# Patient Record
Sex: FEMALE | Race: WHITE | NOT HISPANIC OR LATINO | Employment: UNEMPLOYED | URBAN - METROPOLITAN AREA
[De-identification: names, ages, dates, MRNs, and addresses within clinical notes are randomized per-mention and may not be internally consistent; named-entity substitution may affect disease eponyms.]

---

## 2017-06-19 ENCOUNTER — APPOINTMENT (OUTPATIENT)
Dept: WOMENS IMAGING | Facility: HOSPITAL | Age: 57
End: 2017-06-19

## 2017-06-19 PROCEDURE — 77063 BREAST TOMOSYNTHESIS BI: CPT | Performed by: RADIOLOGY

## 2017-06-19 PROCEDURE — MDREVIEWSP: Performed by: RADIOLOGY

## 2017-06-19 PROCEDURE — G0202 SCR MAMMO BI INCL CAD: HCPCS | Performed by: RADIOLOGY

## 2017-06-19 PROCEDURE — 77067 SCR MAMMO BI INCL CAD: CPT | Performed by: RADIOLOGY

## 2017-07-24 ENCOUNTER — TELEPHONE (OUTPATIENT)
Dept: OBSTETRICS AND GYNECOLOGY | Age: 57
End: 2017-07-24

## 2017-07-24 NOTE — TELEPHONE ENCOUNTER
Dr HUERTAS pt on Estradiol 0.035mg and progesterone 200mg. Was on vacation in [place], her breast started hurting like she was going to start her period, she did change her patch a day late and the progesterone was exposed to heat. This past Saturday she started bleeding, not heavy, last night pt stated she started on a new patch. Please advise

## 2017-07-31 ENCOUNTER — OFFICE VISIT (OUTPATIENT)
Dept: OBSTETRICS AND GYNECOLOGY | Age: 57
End: 2017-07-31

## 2017-07-31 ENCOUNTER — PROCEDURE VISIT (OUTPATIENT)
Dept: OBSTETRICS AND GYNECOLOGY | Age: 57
End: 2017-07-31

## 2017-07-31 VITALS
DIASTOLIC BLOOD PRESSURE: 80 MMHG | BODY MASS INDEX: 20.57 KG/M2 | SYSTOLIC BLOOD PRESSURE: 142 MMHG | HEIGHT: 62 IN | WEIGHT: 111.8 LBS

## 2017-07-31 DIAGNOSIS — N95.0 POSTMENOPAUSAL BLEEDING: ICD-10-CM

## 2017-07-31 DIAGNOSIS — E78.00 HYPERCHOLESTEREMIA: Primary | ICD-10-CM

## 2017-07-31 DIAGNOSIS — N83.202 CYST OF LEFT OVARY: ICD-10-CM

## 2017-07-31 DIAGNOSIS — N95.0 PMB (POSTMENOPAUSAL BLEEDING): Primary | ICD-10-CM

## 2017-07-31 PROCEDURE — 76830 TRANSVAGINAL US NON-OB: CPT | Performed by: OBSTETRICS & GYNECOLOGY

## 2017-07-31 PROCEDURE — 99213 OFFICE O/P EST LOW 20 MIN: CPT | Performed by: OBSTETRICS & GYNECOLOGY

## 2017-07-31 RX ORDER — VALACYCLOVIR HYDROCHLORIDE 1 G/1
TABLET, FILM COATED ORAL
Refills: 2 | COMMUNITY
Start: 2017-07-27

## 2017-07-31 RX ORDER — EZETIMIBE 10 MG/1
TABLET ORAL
Refills: 3 | COMMUNITY
Start: 2017-07-27

## 2017-07-31 RX ORDER — VALACYCLOVIR HYDROCHLORIDE 500 MG/1
TABLET, FILM COATED ORAL
Refills: 2 | COMMUNITY
Start: 2017-07-28 | End: 2018-01-25

## 2017-07-31 NOTE — PROGRESS NOTES
Subjective   Yasmin Armenta is a 57 y.o. female is being seen today for PMB ultrasound.  Chief Complaint   Patient presents with   • Gynecologic Exam   .    History of Present Illness  Patient presents for an ultrasound for postmenopausal bleeding.  When she first started on HRT she was still somewhat perimenopausal so we do not know if she is 100% over bleeding part.  She is on HRT and doing well at the level she is on.  She had some bleeding last October and I increased her progesterone to 200 mg at that time the lining was 6.5 and she is done very well since until just recently had some more bleeding.  She presents for an ultrasound  The following portions of the patient's history were reviewed and updated as appropriate: allergies, current medications, past family history, past medical history, past social history, past surgical history and problem list.    Vitals:    07/31/17 1439   BP: 142/80         PAST MEDICAL HISTORY  Past Medical History:   Diagnosis Date   • Chest pain    • Health care maintenance    • Hyperlipidemia    • Mitral regurgitation    • Mitral valve prolapse      OB History     No data available        History reviewed. No pertinent surgical history.  Family History   Problem Relation Age of Onset   • Heart attack Mother    • Heart disease Mother    • Hyperlipidemia Mother    • Heart attack Father    • Heart disease Father    • Hypertension Father    • No Known Problems Sister    • No Known Problems Brother    • Stroke Maternal Grandmother    • No Known Problems Maternal Grandfather    • Stroke Paternal Grandmother    • Heart disease Paternal Grandfather    • Heart attack Paternal Grandfather      History   Smoking Status   • Never Smoker   Smokeless Tobacco   • Not on file     Comment: caffine use       Current Outpatient Prescriptions:   •  amphetamine-dextroamphetamine (ADDERALL) 20 MG tablet, Take 20 mg by mouth Daily. 1.5 tablets 30mg , Disp: , Rfl:   •  estradiol (VIVELLE-DOT) 0.0375  MG/24HR, APPLY 1 PATCH TWICE WEEKLY AS DIRECTED., Disp: 8 patch, Rfl: 9  •  ezetimibe (ZETIA) 10 MG tablet, TAKE 1 TABLET EVERY DAY, Disp: , Rfl: 3  •  montelukast (SINGULAIR) 10 MG tablet, Take 10 mg by mouth Every Night., Disp: , Rfl:   •  progesterone (PROMETRIUM) 200 MG capsule, TAKE 1 CAPSULE EVERY DAY, Disp: 90 capsule, Rfl: 2  •  spironolactone (ALDACTONE) 100 MG tablet, Take 100 mg by mouth As Needed., Disp: , Rfl:   •  SUMAtriptan (IMITREX) 50 MG tablet, Take one tablet at onset of headache. May repeat dose one time in 2 hours if headache not relieved., Disp: , Rfl: 3  •  valACYclovir (VALTREX) 1000 MG tablet, 2 TABLETS BY MOUTH AS DIRECTED NOW AT ONSET OF FEVER BLISTER SYMPTOMS, REPEAT IN 12 HOURS, Disp: , Rfl: 2  •  valACYclovir (VALTREX) 500 MG tablet, TAKE 1 TABLET EVERY DAY, Disp: , Rfl: 2    There is no immunization history on file for this patient.    Review of Systems  GI  negative no other complaints.  She is under stress she has a marriage and her family coming up in a week.  She also mentioned about changing altitude from 10,000 down to see level and that's when the bleeding started.  And she is off a little bit in the way she takes her patch and will try to get more regular.(  Objective   Physical Exam  Well-developed well-nourished white female no acute distress    Assessment/Plan   Yasmin was seen today for gynecologic exam.    Diagnoses and all orders for this visit:    Hypercholesteremia    Postmenopausal bleeding    Cyst of left ovary    Transvaginal ultrasound was performed and I was in the room the whole time and discussed all the results with the patient and followed up with a discussion my office.  Over 30 minutes were spent with the patient more than half of which was spent in counseling.  Uterus did show 3 small fibroids this is more than what she had last time and they are patent tensely slightly larger and I talked about that in terms of estrogen etc.  After the wedding we will try  to cut back to 0.025 on the patch.  The lining was 4.3 which is an improvement from the 6.5 in October.  We could not see the right ovary left ovary had a cyst 24.7 mm.  I will follow up in 2 months to repeat the scan for that ovary.

## 2017-10-10 RX ORDER — ESTRADIOL 0.04 MG/D
FILM, EXTENDED RELEASE TRANSDERMAL
Qty: 8 PATCH | Refills: 3 | Status: SHIPPED | OUTPATIENT
Start: 2017-10-10 | End: 2018-02-02 | Stop reason: SDUPTHER

## 2017-10-16 ENCOUNTER — PROCEDURE VISIT (OUTPATIENT)
Dept: OBSTETRICS AND GYNECOLOGY | Age: 57
End: 2017-10-16

## 2017-10-16 ENCOUNTER — OFFICE VISIT (OUTPATIENT)
Dept: OBSTETRICS AND GYNECOLOGY | Age: 57
End: 2017-10-16

## 2017-10-16 VITALS — DIASTOLIC BLOOD PRESSURE: 80 MMHG | SYSTOLIC BLOOD PRESSURE: 132 MMHG

## 2017-10-16 DIAGNOSIS — N95.0 PMB (POSTMENOPAUSAL BLEEDING): Primary | ICD-10-CM

## 2017-10-16 DIAGNOSIS — N95.0 POSTMENOPAUSAL BLEEDING: ICD-10-CM

## 2017-10-16 DIAGNOSIS — N83.202 CYST OF LEFT OVARY: Primary | ICD-10-CM

## 2017-10-16 DIAGNOSIS — D25.9 UTERINE LEIOMYOMA, UNSPECIFIED LOCATION: ICD-10-CM

## 2017-10-16 DIAGNOSIS — N83.202 CYST OF LEFT OVARY: ICD-10-CM

## 2017-10-16 PROCEDURE — 99212 OFFICE O/P EST SF 10 MIN: CPT | Performed by: OBSTETRICS & GYNECOLOGY

## 2017-10-16 PROCEDURE — 76830 TRANSVAGINAL US NON-OB: CPT | Performed by: OBSTETRICS & GYNECOLOGY

## 2017-10-16 NOTE — PROGRESS NOTES
Subjective   Yasmin Armenta is a 57 y.o. female is being seen today for a follow up ultrasound.   Chief Complaint   Patient presents with   • Follow-up     Ultrasound   .    History of Present Illness  Patient is here for a follow-up for her ovarian cyst.  She is back to full strength on the patch because when she cut down she had a few side effects.  She's had no more further bleeding.  No other complaints  The following portions of the patient's history were reviewed and updated as appropriate: allergies, current medications, past family history, past medical history, past social history, past surgical history and problem list.    Vitals:    10/16/17 1114   BP: 132/80         PAST MEDICAL HISTORY  Past Medical History:   Diagnosis Date   • Chest pain    • Health care maintenance    • Hyperlipidemia    • Mitral regurgitation    • Mitral valve prolapse      OB History     No data available        History reviewed. No pertinent surgical history.  Family History   Problem Relation Age of Onset   • Heart attack Mother    • Heart disease Mother    • Hyperlipidemia Mother    • Heart attack Father    • Heart disease Father    • Hypertension Father    • No Known Problems Sister    • No Known Problems Brother    • Stroke Maternal Grandmother    • No Known Problems Maternal Grandfather    • Stroke Paternal Grandmother    • Heart disease Paternal Grandfather    • Heart attack Paternal Grandfather      History   Smoking Status   • Never Smoker   Smokeless Tobacco   • Never Used     Comment: caffine use       Current Outpatient Prescriptions:   •  amphetamine-dextroamphetamine (ADDERALL) 20 MG tablet, Take 20 mg by mouth Daily. 1.5 tablets 30mg , Disp: , Rfl:   •  estradiol (VIVELLE-DOT) 0.0375 MG/24HR, APPLY 1 PATCH TWICE WEEKLY AS DIRECTED., Disp: 8 patch, Rfl: 3  •  ezetimibe (ZETIA) 10 MG tablet, TAKE 1 TABLET EVERY DAY, Disp: , Rfl: 3  •  montelukast (SINGULAIR) 10 MG tablet, Take 10 mg by mouth Every Night., Disp: , Rfl:    •  progesterone (PROMETRIUM) 200 MG capsule, TAKE 1 CAPSULE EVERY DAY, Disp: 90 capsule, Rfl: 2  •  spironolactone (ALDACTONE) 100 MG tablet, Take 100 mg by mouth As Needed., Disp: , Rfl:   •  SUMAtriptan (IMITREX) 50 MG tablet, Take one tablet at onset of headache. May repeat dose one time in 2 hours if headache not relieved., Disp: , Rfl: 3  •  valACYclovir (VALTREX) 1000 MG tablet, 2 TABLETS BY MOUTH AS DIRECTED NOW AT ONSET OF FEVER BLISTER SYMPTOMS, REPEAT IN 12 HOURS, Disp: , Rfl: 2  •  valACYclovir (VALTREX) 500 MG tablet, TAKE 1 TABLET EVERY DAY, Disp: , Rfl: 2    There is no immunization history on file for this patient.    Review of Systems  Negative  Objective   Physical Exam  Well-developed well-nourished white female no acute distress    Assessment/Plan   Yasmin was seen today for follow-up.    Diagnoses and all orders for this visit:    Cyst of left ovary    Transvaginal ultrasound was performed and I was in the room the whole time and discussed the results with the patient.  Perineum is normal.  The uterus still has his fibroids of courses lining is now under 4 mm.  No cysts were seen and affect ovaries were seen.  Come back for an annual in a few months

## 2018-01-25 ENCOUNTER — OFFICE VISIT (OUTPATIENT)
Dept: OBSTETRICS AND GYNECOLOGY | Age: 58
End: 2018-01-25

## 2018-01-25 VITALS
HEIGHT: 62 IN | WEIGHT: 112 LBS | BODY MASS INDEX: 20.61 KG/M2 | SYSTOLIC BLOOD PRESSURE: 114 MMHG | DIASTOLIC BLOOD PRESSURE: 58 MMHG

## 2018-01-25 DIAGNOSIS — M85.89 OSTEOPENIA OF MULTIPLE SITES: ICD-10-CM

## 2018-01-25 DIAGNOSIS — N95.1 MENOPAUSAL SYMPTOMS: Primary | ICD-10-CM

## 2018-01-25 DIAGNOSIS — Z00.00 ANNUAL PHYSICAL EXAM: ICD-10-CM

## 2018-01-25 DIAGNOSIS — Z11.51 SPECIAL SCREENING EXAMINATION FOR HUMAN PAPILLOMAVIRUS (HPV): ICD-10-CM

## 2018-01-25 DIAGNOSIS — Z12.4 ROUTINE CERVICAL SMEAR: ICD-10-CM

## 2018-01-25 PROCEDURE — 99396 PREV VISIT EST AGE 40-64: CPT | Performed by: OBSTETRICS & GYNECOLOGY

## 2018-01-25 NOTE — PROGRESS NOTES
Subjective   Yasmin Armenta is a 57 y.o. female is being seen today for an annual exam.  Chief Complaint   Patient presents with   • Gynecologic Exam   .    History of Present Illness  Patient presents for an annual exam.  Overall she doing very well no major complaints today or in the past year.  No new family history bowels and bladder are stable and doing okay.  No further bleeding since the last visit.  There is traveling some father is about to turn 85.  Blood pressure days better than it has been for a while.  Neck occasional she is on vacation in the early winter.  Out of 10,000 that has made her spot in the past.  The following portions of the patient's history were reviewed and updated as appropriate: allergies, current medications, past family history, past medical history, past social history, past surgical history and problem list.    Vitals:    01/25/18 1400   BP: 114/58       PAST MEDICAL HISTORY  Past Medical History:   Diagnosis Date   • Chest pain    • Health care maintenance    • Hyperlipidemia    • Mitral regurgitation    • Mitral valve prolapse      OB History     No data available        History reviewed. No pertinent surgical history.  Family History   Problem Relation Age of Onset   • Heart attack Mother    • Heart disease Mother    • Hyperlipidemia Mother    • Heart attack Father    • Heart disease Father    • Hypertension Father    • No Known Problems Sister    • No Known Problems Brother    • Stroke Maternal Grandmother    • No Known Problems Maternal Grandfather    • Stroke Paternal Grandmother    • Heart disease Paternal Grandfather    • Heart attack Paternal Grandfather      History   Smoking Status   • Never Smoker   Smokeless Tobacco   • Never Used     Comment: caffine use       Current Outpatient Prescriptions:   •  amphetamine-dextroamphetamine (ADDERALL) 20 MG tablet, Take 20 mg by mouth Daily. 1.5 tablets 30mg , Disp: , Rfl:   •  estradiol (VIVELLE-DOT) 0.0375 MG/24HR, APPLY 1 PATCH  TWICE WEEKLY AS DIRECTED., Disp: 8 patch, Rfl: 3  •  ezetimibe (ZETIA) 10 MG tablet, TAKE 1 TABLET EVERY DAY, Disp: , Rfl: 3  •  progesterone (PROMETRIUM) 200 MG capsule, TAKE 1 CAPSULE EVERY DAY, Disp: 90 capsule, Rfl: 2  •  spironolactone (ALDACTONE) 100 MG tablet, Take 100 mg by mouth As Needed., Disp: , Rfl:   •  SUMAtriptan (IMITREX) 50 MG tablet, Take one tablet at onset of headache. May repeat dose one time in 2 hours if headache not relieved., Disp: , Rfl: 3  •  valACYclovir (VALTREX) 1000 MG tablet, 2 TABLETS BY MOUTH AS DIRECTED NOW AT ONSET OF FEVER BLISTER SYMPTOMS, REPEAT IN 12 HOURS, Disp: , Rfl: 2    There is no immunization history on file for this patient.    Review of Systems   Constitutional: Negative for chills, fatigue, fever and unexpected weight change.   Respiratory: Negative for shortness of breath and wheezing.    Cardiovascular: Negative for chest pain.   Gastrointestinal: Negative for abdominal distention, abdominal pain, blood in stool, constipation, diarrhea and nausea.   Genitourinary: Negative for difficulty urinating, dyspareunia, dysuria, frequency, hematuria, menstrual problem, pelvic pain, urgency and vaginal discharge.   Skin: Negative for rash.       Objective   Physical Exam   Constitutional: She is oriented to person, place, and time. Vital signs are normal. She appears well-developed and well-nourished.   Neck: No thyromegaly present.   Cardiovascular: Normal rate, regular rhythm and normal heart sounds.    Pulmonary/Chest: Effort normal. Right breast exhibits no inverted nipple, no mass, no nipple discharge, no skin change and no tenderness. Left breast exhibits no inverted nipple, no mass, no nipple discharge, no skin change and no tenderness. Breasts are symmetrical. There is no breast swelling.   Abdominal: Soft.   Genitourinary: Vagina normal and uterus normal. No breast tenderness, discharge or bleeding. Pelvic exam was performed with patient supine. No labial fusion.  There is no rash, tenderness, lesion or injury on the right labia. There is no rash, tenderness, lesion or injury on the left labia. Cervix exhibits no motion tenderness, no discharge and no friability. Right adnexum displays no mass, no tenderness and no fullness. Left adnexum displays no mass, no tenderness and no fullness.   Neurological: She is alert and oriented to person, place, and time.   Psychiatric: She has a normal mood and affect.   Vitals reviewed.        Assessment/Plan   Yasmin was seen today for gynecologic exam.    Diagnoses and all orders for this visit:    Menopausal symptoms    Osteopenia of multiple sites    Annual physical exam    All in all very normal exam today.  Should there is some encapsulation of her breasts but she is not about to change that at this time.  Pap smear was done today.  Mammogram will be due in June.  Colonoscopy was in 16 she probably and a 5 year schedule for family history the bone densities up-to-date last year.  Diet and excise were discussed she is good at that come back in year

## 2018-01-29 LAB
CYTOLOGIST CVX/VAG CYTO: NORMAL
CYTOLOGY CVX/VAG DOC THIN PREP: NORMAL
DX ICD CODE: NORMAL
HIV 1 & 2 AB SER-IMP: NORMAL
HPV I/H RISK 4 DNA CVX QL PROBE+SIG AMP: NEGATIVE
OTHER STN SPEC: NORMAL
PATH REPORT.FINAL DX SPEC: NORMAL
STAT OF ADQ CVX/VAG CYTO-IMP: NORMAL

## 2018-02-02 RX ORDER — ESTRADIOL 0.04 MG/D
FILM, EXTENDED RELEASE TRANSDERMAL
Qty: 8 PATCH | Refills: 3 | Status: SHIPPED | OUTPATIENT
Start: 2018-02-02 | End: 2018-04-09 | Stop reason: SDUPTHER

## 2018-03-02 NOTE — TELEPHONE ENCOUNTER
Dr HUERTAS pt left her progesterone in [place], leaving town today at noon, needs another Rx sent into her pharm

## 2018-04-09 RX ORDER — ESTRADIOL 0.04 MG/D
1 FILM, EXTENDED RELEASE TRANSDERMAL 2 TIMES WEEKLY
Qty: 8 PATCH | Refills: 3 | Status: SHIPPED | OUTPATIENT
Start: 2018-04-09 | End: 2018-07-11 | Stop reason: SDUPTHER

## 2018-07-11 RX ORDER — ESTRADIOL 0.04 MG/D
1 FILM, EXTENDED RELEASE TRANSDERMAL 2 TIMES WEEKLY
Qty: 8 PATCH | Refills: 5 | Status: SHIPPED | OUTPATIENT
Start: 2018-07-12 | End: 2019-02-15 | Stop reason: SDUPTHER

## 2019-02-15 ENCOUNTER — OFFICE VISIT (OUTPATIENT)
Dept: OBSTETRICS AND GYNECOLOGY | Age: 59
End: 2019-02-15

## 2019-02-15 DIAGNOSIS — Z00.00 ANNUAL PHYSICAL EXAM: Primary | ICD-10-CM

## 2019-02-15 DIAGNOSIS — Z12.39 BREAST SCREENING: ICD-10-CM

## 2019-02-15 PROBLEM — G43.909 MIGRAINE: Status: ACTIVE | Noted: 2018-01-22

## 2019-02-15 PROBLEM — I67.1 ANEURYSM OF LEFT INTERNAL CAROTID ARTERY: Status: ACTIVE | Noted: 2019-01-08

## 2019-02-15 PROBLEM — F90.0 ATTENTION DEFICIT HYPERACTIVITY DISORDER, PREDOMINANTLY INATTENTIVE TYPE: Status: ACTIVE | Noted: 2018-01-22

## 2019-02-15 PROBLEM — I67.1 NONRUPTURED CEREBRAL ANEURYSM: Status: ACTIVE | Noted: 2018-12-17

## 2019-02-15 PROBLEM — G50.0 TRIGEMINAL NEURALGIA OF RIGHT SIDE OF FACE: Status: ACTIVE | Noted: 2018-12-17

## 2019-02-15 PROBLEM — I67.1 ANEURYSM OF LEFT INTERNAL CAROTID ARTERY: Status: RESOLVED | Noted: 2019-01-08 | Resolved: 2019-02-15

## 2019-02-15 PROCEDURE — 99396 PREV VISIT EST AGE 40-64: CPT | Performed by: OBSTETRICS & GYNECOLOGY

## 2019-02-15 RX ORDER — ESTRADIOL 0.04 MG/D
1 FILM, EXTENDED RELEASE TRANSDERMAL 2 TIMES WEEKLY
Qty: 24 PATCH | Refills: 3 | Status: SHIPPED | OUTPATIENT
Start: 2019-02-18 | End: 2019-02-15 | Stop reason: SDUPTHER

## 2019-02-15 RX ORDER — ESTRADIOL 0.04 MG/D
1 FILM, EXTENDED RELEASE TRANSDERMAL 2 TIMES WEEKLY
Qty: 24 PATCH | Refills: 3 | Status: SHIPPED | OUTPATIENT
Start: 2019-02-18 | End: 2020-01-13

## 2019-02-15 RX ORDER — GABAPENTIN 100 MG/1
100 CAPSULE ORAL 2 TIMES DAILY
Refills: 1 | COMMUNITY
Start: 2018-12-18 | End: 2019-04-30

## 2019-02-15 NOTE — PROGRESS NOTES
Subjective   Yasmin Armenta is a 58 y.o. female is being seen today for No chief complaint on file.  .    History of Present Illness  Patient is here for an annual check.  She did have a major problem with come up this year.  She had her second shingles vaccine and immediately had a.  She had trigeminal neuralgia went on for 6-8 weeks.  Initially they did not know what was and initially with a MRI of the brain thought she might have an aneurysm.  By the second 1 did not show that she will should probably get one more in about 3 months to double check on that.  She is better now ever being treated with gabapentin and steroids.  Other than that she is doing well expecting first grandchild about a month and a half.  She is doing well in the HRT does not want to change anything if she is a day late she starts having hot flashes.  No new family history bowels and bladder work well no other complaints  The following portions of the patient's history were reviewed and updated as appropriate: allergies, current medications, past family history, past medical history, past social history, past surgical history and problem list.    There were no vitals filed for this visit.    PAST MEDICAL HISTORY  Past Medical History:   Diagnosis Date   • Chest pain    • Health care maintenance    • Hyperlipidemia    • Mitral regurgitation    • Mitral valve prolapse      OB History     No data available        History reviewed. No pertinent surgical history.  Family History   Problem Relation Age of Onset   • Heart attack Mother    • Heart disease Mother    • Hyperlipidemia Mother    • Heart attack Father    • Heart disease Father    • Hypertension Father    • No Known Problems Sister    • No Known Problems Brother    • Stroke Maternal Grandmother    • No Known Problems Maternal Grandfather    • Stroke Paternal Grandmother    • Heart disease Paternal Grandfather    • Heart attack Paternal Grandfather      Social History     Tobacco Use    Smoking Status Never Smoker   Smokeless Tobacco Never Used   Tobacco Comment    caffine use       Current Outpatient Medications:   •  amphetamine-dextroamphetamine (ADDERALL) 20 MG tablet, Take 20 mg by mouth Daily. 1.5 tablets 30mg , Disp: , Rfl:   •  [START ON 2/18/2019] estradiol (VIVELLE-DOT) 0.0375 MG/24HR, Place 1 patch on the skin as directed by provider 2 (Two) Times a Week., Disp: 24 patch, Rfl: 3  •  ezetimibe (ZETIA) 10 MG tablet, TAKE 1 TABLET EVERY DAY, Disp: , Rfl: 3  •  progesterone (PROMETRIUM) 200 MG capsule, Take 1 capsule by mouth Daily., Disp: 90 capsule, Rfl: 3  •  spironolactone (ALDACTONE) 100 MG tablet, Take 100 mg by mouth As Needed., Disp: , Rfl:   •  cephalexin (KEFLEX) 500 MG capsule, Take 1 capsule by mouth 4 (Four) Times a Day., Disp: 28 capsule, Rfl: 0  •  gabapentin (NEURONTIN) 100 MG capsule, Take 100 mg by mouth 2 (Two) Times a Day., Disp: , Rfl: 1  •  SUMAtriptan (IMITREX) 50 MG tablet, Take one tablet at onset of headache. May repeat dose one time in 2 hours if headache not relieved., Disp: , Rfl: 3  •  valACYclovir (VALTREX) 1000 MG tablet, 2 TABLETS BY MOUTH AS DIRECTED NOW AT ONSET OF FEVER BLISTER SYMPTOMS, REPEAT IN 12 HOURS, Disp: , Rfl: 2    There is no immunization history on file for this patient.    Review of Systems   Constitutional: Negative for chills, fatigue, fever and unexpected weight change.   Respiratory: Negative for shortness of breath and wheezing.    Cardiovascular: Negative for chest pain.   Gastrointestinal: Negative for abdominal distention, abdominal pain, blood in stool, constipation, diarrhea and nausea.   Genitourinary: Negative for difficulty urinating, dyspareunia, dysuria, frequency, hematuria, menstrual problem, pelvic pain, urgency and vaginal discharge.   Skin: Negative for rash.       Objective   Physical Exam   Constitutional: She is oriented to person, place, and time. Vital signs are normal. She appears well-developed and well-nourished.    Neck: No thyromegaly present.   Cardiovascular: Normal rate, regular rhythm and normal heart sounds.   Pulmonary/Chest: Effort normal. Right breast exhibits no inverted nipple, no mass, no nipple discharge, no skin change and no tenderness. Left breast exhibits no inverted nipple, no mass, no nipple discharge, no skin change and no tenderness. Breasts are symmetrical. There is no breast swelling.   Abdominal: Soft.   Genitourinary: Vagina normal and uterus normal. No breast tenderness, discharge or bleeding. Pelvic exam was performed with patient supine. No labial fusion. There is no rash, tenderness, lesion or injury on the right labia. There is no rash, tenderness, lesion or injury on the left labia. Cervix exhibits no motion tenderness, no discharge and no friability. Right adnexum displays no mass, no tenderness and no fullness. Left adnexum displays no mass, no tenderness and no fullness.   Neurological: She is alert and oriented to person, place, and time.   Psychiatric: She has a normal mood and affect.   Vitals reviewed.        Assessment/Plan   Diagnoses and all orders for this visit:    Annual physical exam    Breast screening  -     Mammo Screening Digital Tomosynthesis Bilateral With CAD; Future    Other orders  -     estradiol (VIVELLE-DOT) 0.0375 MG/24HR; Place 1 patch on the skin as directed by provider 2 (Two) Times a Week.  -     progesterone (PROMETRIUM) 200 MG capsule; Take 1 capsule by mouth Daily.      Eye exam was normal today.  Pap smear was done last year so not done today.  Refill her HRT.  She needs a mammogram overdue she does lost track of time last year so that was ordered today.  Bone density 16 up-to-date colonoscopy also 16 up-to-date.  She is always good with exercise back in a year

## 2019-02-25 ENCOUNTER — APPOINTMENT (OUTPATIENT)
Dept: WOMENS IMAGING | Facility: HOSPITAL | Age: 59
End: 2019-02-25

## 2019-02-25 ENCOUNTER — TELEPHONE (OUTPATIENT)
Dept: OBSTETRICS AND GYNECOLOGY | Age: 59
End: 2019-02-25

## 2019-02-25 DIAGNOSIS — N64.89 BREAST ASYMMETRY: Primary | ICD-10-CM

## 2019-02-25 PROCEDURE — MDREVIEWSP: Performed by: RADIOLOGY

## 2019-02-25 PROCEDURE — 76641 ULTRASOUND BREAST COMPLETE: CPT | Performed by: RADIOLOGY

## 2019-02-25 PROCEDURE — 77067 SCR MAMMO BI INCL CAD: CPT | Performed by: RADIOLOGY

## 2019-02-25 PROCEDURE — 77063 BREAST TOMOSYNTHESIS BI: CPT | Performed by: RADIOLOGY

## 2019-02-25 NOTE — TELEPHONE ENCOUNTER
[facility] calling, pt there getting mg, they are requesting order for Left breast US due to focal asymetry. Was able to give verbal, [facility] requesting we fax over order

## 2019-03-05 DIAGNOSIS — N64.89 BREAST ASYMMETRY: ICD-10-CM

## 2019-03-05 DIAGNOSIS — Z12.39 BREAST SCREENING: ICD-10-CM

## 2019-04-30 ENCOUNTER — OFFICE VISIT (OUTPATIENT)
Dept: CARDIOLOGY | Facility: CLINIC | Age: 59
End: 2019-04-30

## 2019-04-30 VITALS
HEART RATE: 104 BPM | DIASTOLIC BLOOD PRESSURE: 72 MMHG | SYSTOLIC BLOOD PRESSURE: 120 MMHG | OXYGEN SATURATION: 98 % | BODY MASS INDEX: 20.24 KG/M2 | HEIGHT: 62 IN | WEIGHT: 110 LBS

## 2019-04-30 DIAGNOSIS — I34.0 MITRAL VALVE INSUFFICIENCY, UNSPECIFIED ETIOLOGY: Primary | ICD-10-CM

## 2019-04-30 PROCEDURE — 93000 ELECTROCARDIOGRAM COMPLETE: CPT | Performed by: PHYSICIAN ASSISTANT

## 2019-04-30 PROCEDURE — 99213 OFFICE O/P EST LOW 20 MIN: CPT | Performed by: PHYSICIAN ASSISTANT

## 2019-04-30 NOTE — PROGRESS NOTES
Date of Office Visit: 2019  Encounter Provider: JD Khan  Place of Service: [facility] CARDIOLOGY  Patient Name: Yasmin Armenta  :1960    Chief Complaint   Patient presents with   • Cardiac Valve Problem     1 year follow up   :     HPI: Yasmin Armenta is a 58 y.o. female who presents today for follow-up.  Old records have been obtained and reviewed by me.  She is a patient of Dr. Arenas with a past medical history significant for congenital heart disease.  She has a strong history of valvular disease in her family.  She herself has some mild mitral prolapse and mitral regurgitation.  She was last in our office to see me on 10/26/2016.  At that visit she was doing well without complaints of angina or heart failure.  She was exercising regularly with running and yoga and able to do this without any difficulty.  We have not checked an echocardiogram on her in quite some time.  At her visit with me in , my recommendation was for her to follow-up with Dr. Roach in 1 year.  She has not been seen in our office since.   Since she was last in our office she has been doing great.  She is still running and doing yoga regularly.  She spends a lot of time in [place] and will hike for 7 hours at a time.  She can do this without difficulty.  She denies any chest pain, shortness of breath, palpitations, edema, dizziness, or syncope.  Occasionally she gets a little lightheaded upon standing.      Past Medical History:   Diagnosis Date   • Chest pain    • Health care maintenance    • Hyperlipidemia    • Mitral regurgitation    • Mitral valve prolapse        History reviewed. No pertinent surgical history.    Social History     Socioeconomic History   • Marital status:      Spouse name: BAMBI   • Number of children: Not on file   • Years of education: Not on file   • Highest education level: Not on file   Tobacco Use   • Smoking status: Never Smoker   • Smokeless tobacco:  Never Used   • Tobacco comment: caffine use   Substance and Sexual Activity   • Alcohol use: Yes     Alcohol/week: 0.6 oz     Types: 1 Glasses of wine per week     Comment: at times   • Drug use: No   • Sexual activity: Defer     Partners: Male     Birth control/protection: Post-menopausal     Comment: spouse = BAMBI        Family History   Problem Relation Age of Onset   • Heart attack Mother    • Heart disease Mother    • Hyperlipidemia Mother    • Heart attack Father    • Heart disease Father    • Hypertension Father    • No Known Problems Sister    • No Known Problems Brother    • Stroke Maternal Grandmother    • No Known Problems Maternal Grandfather    • Stroke Paternal Grandmother    • Heart disease Paternal Grandfather    • Heart attack Paternal Grandfather        Review of Systems   Constitution: Negative for chills, fever and malaise/fatigue.   Cardiovascular: Negative for chest pain, dyspnea on exertion, leg swelling, near-syncope, orthopnea, palpitations, paroxysmal nocturnal dyspnea and syncope.   Respiratory: Negative for cough and shortness of breath.    Musculoskeletal: Negative for joint pain, joint swelling and myalgias.   Gastrointestinal: Negative for abdominal pain, diarrhea, melena, nausea and vomiting.   Genitourinary: Negative for frequency and hematuria.   Neurological: Negative for light-headedness, numbness, paresthesias and seizures.   Allergic/Immunologic: Negative.    All other systems reviewed and are negative.      Allergies   Allergen Reactions   • Codeine    • Rosuvastatin Other (See Comments)     Other reaction(s): Myalgias (Muscle Pain)   • Simvastatin Myalgia     Other reaction(s): Myalgias (Muscle Pain)     • Sulfa Antibiotics          Current Outpatient Medications:   •  amphetamine-dextroamphetamine (ADDERALL) 20 MG tablet, Take 20 mg by mouth Daily. 1.5 tablets 30mg , Disp: , Rfl:   •  estradiol (VIVELLE-DOT) 0.0375 MG/24HR, Place 1 patch on the skin as directed by provider 2  "(Two) Times a Week., Disp: 24 patch, Rfl: 3  •  ezetimibe (ZETIA) 10 MG tablet, TAKE 1 TABLET EVERY DAY, Disp: , Rfl: 3  •  progesterone (PROMETRIUM) 200 MG capsule, Take 1 capsule by mouth Daily., Disp: 90 capsule, Rfl: 3  •  spironolactone (ALDACTONE) 100 MG tablet, Take 100 mg by mouth As Needed., Disp: , Rfl:   •  SUMAtriptan (IMITREX) 50 MG tablet, Take one tablet at onset of headache. May repeat dose one time in 2 hours if headache not relieved., Disp: , Rfl: 3  •  valACYclovir (VALTREX) 1000 MG tablet, 2 TABLETS BY MOUTH AS DIRECTED NOW AT ONSET OF FEVER BLISTER SYMPTOMS, REPEAT IN 12 HOURS, Disp: , Rfl: 2      Objective:     Vitals:    04/30/19 1330 04/30/19 1338   BP: 118/78 120/72   BP Location: Right arm Left arm   Pulse: 104    SpO2: 98%    Weight: 49.9 kg (110 lb)    Height: 157.5 cm (62\")      Body mass index is 20.12 kg/m².    PHYSICAL EXAM:    Physical Exam   Constitutional: She is oriented to person, place, and time. She appears well-developed and well-nourished. No distress.   HENT:   Head: Normocephalic and atraumatic.   Eyes: Pupils are equal, round, and reactive to light.   Neck: No JVD present. No thyromegaly present.   Cardiovascular: Normal rate, regular rhythm and intact distal pulses.   Murmur heard.   Crescendo-decrescendo mid to late systolic murmur is present with a grade of 3/6 at the apex.  Pulmonary/Chest: Effort normal and breath sounds normal. No respiratory distress.   Abdominal: Soft. Bowel sounds are normal. She exhibits no distension. There is no splenomegaly or hepatomegaly. There is no tenderness.   Musculoskeletal: Normal range of motion. She exhibits no edema.   Neurological: She is alert and oriented to person, place, and time.   Skin: Skin is warm and dry. She is not diaphoretic. No erythema.   Psychiatric: She has a normal mood and affect. Her behavior is normal. Judgment normal.         ECG 12 Lead  Date/Time: 4/30/2019 1:48 PM  Performed by: Unique Calle, " PA  Authorized by: Unique Calle PA   Comparison: compared with previous ECG from 10/26/2016  Similar to previous ECG  Rhythm: sinus rhythm  BPM: 96    Clinical impression: normal ECG  Comments: Indication: Mitral regurgitation.              Assessment:       Diagnosis Plan   1. Mitral valve insufficiency, unspecified etiology  ECG 12 Lead     Orders Placed This Encounter   Procedures   • ECG 12 Lead     This order was created via procedure documentation          Plan:       Overall she is doing extremely well.  She is extremely active and healthy.  She has a diagnosis of mitral valve prolapse and degenerative mitral valve regurgitation, however we have not checked an echocardiogram in quite some time.  She has a strong family history of congenital heart disease.  I do hear a murmur of mitral prolapse.  At this point in time I am going to check an echocardiogram to see where we are with her mitral valve.  She is asymptomatic.  I am going to have her follow-up with Dr. Roberts in 1 year as a transference of care from Dr. Roach.  She also would like to get her son established with a cardiologist as he has a history of ASD since birth.        As always, it has been a pleasure to participate in your patient's care.      Sincerely,         Unique Calle PA-C

## 2019-05-13 ENCOUNTER — HOSPITAL ENCOUNTER (OUTPATIENT)
Dept: CARDIOLOGY | Facility: HOSPITAL | Age: 59
Discharge: HOME | End: 2019-05-13
Admitting: PHYSICIAN ASSISTANT

## 2019-05-13 VITALS
WEIGHT: 110 LBS | BODY MASS INDEX: 20.24 KG/M2 | DIASTOLIC BLOOD PRESSURE: 80 MMHG | HEART RATE: 81 BPM | HEIGHT: 62 IN | SYSTOLIC BLOOD PRESSURE: 110 MMHG

## 2019-05-13 DIAGNOSIS — I34.0 MITRAL VALVE INSUFFICIENCY, UNSPECIFIED ETIOLOGY: ICD-10-CM

## 2019-05-13 LAB
ASCENDING AORTA: 3.1 CM
BH CV ECHO MEAS - ACS: 1.6 CM
BH CV ECHO MEAS - AO MAX PG (FULL): 4.8 MMHG
BH CV ECHO MEAS - AO MAX PG: 8.1 MMHG
BH CV ECHO MEAS - AO MEAN PG (FULL): 1.9 MMHG
BH CV ECHO MEAS - AO MEAN PG: 4.1 MMHG
BH CV ECHO MEAS - AO ROOT AREA (BSA CORRECTED): 1.6
BH CV ECHO MEAS - AO ROOT AREA: 4.5 CM^2
BH CV ECHO MEAS - AO ROOT DIAM: 2.4 CM
BH CV ECHO MEAS - AO V2 MAX: 142.5 CM/SEC
BH CV ECHO MEAS - AO V2 MEAN: 94 CM/SEC
BH CV ECHO MEAS - AO V2 VTI: 24.8 CM
BH CV ECHO MEAS - AVA(I,A): 2.5 CM^2
BH CV ECHO MEAS - AVA(I,D): 2.5 CM^2
BH CV ECHO MEAS - AVA(V,A): 2 CM^2
BH CV ECHO MEAS - AVA(V,D): 2 CM^2
BH CV ECHO MEAS - BSA(HAYCOCK): 1.5 M^2
BH CV ECHO MEAS - BSA: 1.5 M^2
BH CV ECHO MEAS - BZI_BMI: 20.1 KILOGRAMS/M^2
BH CV ECHO MEAS - BZI_METRIC_HEIGHT: 157.5 CM
BH CV ECHO MEAS - BZI_METRIC_WEIGHT: 49.9 KG
BH CV ECHO MEAS - EDV(MOD-SP2): 69 ML
BH CV ECHO MEAS - EDV(MOD-SP4): 70 ML
BH CV ECHO MEAS - EDV(TEICH): 64.3 ML
BH CV ECHO MEAS - EF(CUBED): 71.9 %
BH CV ECHO MEAS - EF(MOD-BP): 63 %
BH CV ECHO MEAS - EF(MOD-SP2): 65.2 %
BH CV ECHO MEAS - EF(MOD-SP4): 61.4 %
BH CV ECHO MEAS - EF(TEICH): 64.3 %
BH CV ECHO MEAS - ESV(MOD-SP2): 24 ML
BH CV ECHO MEAS - ESV(MOD-SP4): 27 ML
BH CV ECHO MEAS - ESV(TEICH): 23 ML
BH CV ECHO MEAS - FS: 34.5 %
BH CV ECHO MEAS - IVS/LVPW: 1
BH CV ECHO MEAS - IVSD: 0.93 CM
BH CV ECHO MEAS - LAT PEAK E' VEL: 13 CM/SEC
BH CV ECHO MEAS - LV DIASTOLIC VOL/BSA (35-75): 47.2 ML/M^2
BH CV ECHO MEAS - LV MASS(C)D: 108.7 GRAMS
BH CV ECHO MEAS - LV MASS(C)DI: 73.3 GRAMS/M^2
BH CV ECHO MEAS - LV MAX PG: 3.4 MMHG
BH CV ECHO MEAS - LV MEAN PG: 2.2 MMHG
BH CV ECHO MEAS - LV SYSTOLIC VOL/BSA (12-30): 18.2 ML/M^2
BH CV ECHO MEAS - LV V1 MAX: 91.7 CM/SEC
BH CV ECHO MEAS - LV V1 MEAN: 71.1 CM/SEC
BH CV ECHO MEAS - LV V1 VTI: 19.5 CM
BH CV ECHO MEAS - LVIDD: 3.9 CM
BH CV ECHO MEAS - LVIDS: 2.5 CM
BH CV ECHO MEAS - LVLD AP2: 6.3 CM
BH CV ECHO MEAS - LVLD AP4: 6.4 CM
BH CV ECHO MEAS - LVLS AP2: 5.4 CM
BH CV ECHO MEAS - LVLS AP4: 5.4 CM
BH CV ECHO MEAS - LVOT AREA (M): 3.1 CM^2
BH CV ECHO MEAS - LVOT AREA: 3.2 CM^2
BH CV ECHO MEAS - LVOT DIAM: 2 CM
BH CV ECHO MEAS - LVPWD: 0.93 CM
BH CV ECHO MEAS - MED PEAK E' VEL: 7 CM/SEC
BH CV ECHO MEAS - MR MAX PG: 10.4 MMHG
BH CV ECHO MEAS - MR MAX VEL: 161.5 CM/SEC
BH CV ECHO MEAS - MV A DUR: 0.13 SEC
BH CV ECHO MEAS - MV A MAX VEL: 68.4 CM/SEC
BH CV ECHO MEAS - MV DEC SLOPE: 226.6 CM/SEC^2
BH CV ECHO MEAS - MV DEC TIME: 0.21 SEC
BH CV ECHO MEAS - MV E MAX VEL: 50.4 CM/SEC
BH CV ECHO MEAS - MV E/A: 0.74
BH CV ECHO MEAS - MV MAX PG: 2.5 MMHG
BH CV ECHO MEAS - MV MEAN PG: 1.3 MMHG
BH CV ECHO MEAS - MV P1/2T MAX VEL: 51.9 CM/SEC
BH CV ECHO MEAS - MV P1/2T: 67.1 MSEC
BH CV ECHO MEAS - MV V2 MAX: 78.7 CM/SEC
BH CV ECHO MEAS - MV V2 MEAN: 56.1 CM/SEC
BH CV ECHO MEAS - MV V2 VTI: 18.6 CM
BH CV ECHO MEAS - MVA P1/2T LCG: 4.2 CM^2
BH CV ECHO MEAS - MVA(P1/2T): 3.3 CM^2
BH CV ECHO MEAS - MVA(VTI): 3.3 CM^2
BH CV ECHO MEAS - PA ACC TIME: 0.15 SEC
BH CV ECHO MEAS - PA MAX PG (FULL): 1.4 MMHG
BH CV ECHO MEAS - PA MAX PG: 2.7 MMHG
BH CV ECHO MEAS - PA PR(ACCEL): 9.3 MMHG
BH CV ECHO MEAS - PA V2 MAX: 81.4 CM/SEC
BH CV ECHO MEAS - PULM A REVS DUR: 0.11 SEC
BH CV ECHO MEAS - PULM A REVS VEL: 69.4 CM/SEC
BH CV ECHO MEAS - PULM DIAS VEL: 36.9 CM/SEC
BH CV ECHO MEAS - PULM S/D: 1.2
BH CV ECHO MEAS - PULM SYS VEL: 45.6 CM/SEC
BH CV ECHO MEAS - PVA(V,A): 2.1 CM^2
BH CV ECHO MEAS - PVA(V,D): 2.1 CM^2
BH CV ECHO MEAS - QP/QS: 0.57
BH CV ECHO MEAS - RAP SYSTOLE: 8 MMHG
BH CV ECHO MEAS - RV MAX PG: 1.2 MMHG
BH CV ECHO MEAS - RV MEAN PG: 0.77 MMHG
BH CV ECHO MEAS - RV V1 MAX: 55.3 CM/SEC
BH CV ECHO MEAS - RV V1 MEAN: 42.3 CM/SEC
BH CV ECHO MEAS - RV V1 VTI: 11.6 CM
BH CV ECHO MEAS - RVOT AREA: 3 CM^2
BH CV ECHO MEAS - RVOT DIAM: 2 CM
BH CV ECHO MEAS - SI(AO): 75.9 ML/M^2
BH CV ECHO MEAS - SI(CUBED): 27.9 ML/M^2
BH CV ECHO MEAS - SI(LVOT): 41.5 ML/M^2
BH CV ECHO MEAS - SI(MOD-SP2): 30.4 ML/M^2
BH CV ECHO MEAS - SI(MOD-SP4): 29 ML/M^2
BH CV ECHO MEAS - SI(TEICH): 27.9 ML/M^2
BH CV ECHO MEAS - SUP REN AO DIAM: 1.8 CM
BH CV ECHO MEAS - SV(AO): 112.5 ML
BH CV ECHO MEAS - SV(CUBED): 41.3 ML
BH CV ECHO MEAS - SV(LVOT): 61.6 ML
BH CV ECHO MEAS - SV(MOD-SP2): 45 ML
BH CV ECHO MEAS - SV(MOD-SP4): 43 ML
BH CV ECHO MEAS - SV(RVOT): 35.4 ML
BH CV ECHO MEAS - SV(TEICH): 41.3 ML
BH CV ECHO MEAS - TAPSE (>1.6): 2.6 CM2
BH CV ECHO MEASUREMENTS AVERAGE E/E' RATIO: 5.04
BH CV XLRA - RV BASE: 2.6 CM
BH CV XLRA - TDI S': 14 CM/SEC
LEFT ATRIUM VOLUME INDEX: 14 ML/M2
LV EF 2D ECHO EST: 63 %
SINUS: 2.7 CM
STJ: 2.8 CM

## 2019-05-13 PROCEDURE — 93306 TTE W/DOPPLER COMPLETE: CPT | Performed by: INTERNAL MEDICINE

## 2019-05-13 PROCEDURE — 93306 TTE W/DOPPLER COMPLETE: CPT

## 2019-05-15 ENCOUNTER — TELEPHONE (OUTPATIENT)
Dept: CARDIOLOGY | Facility: CLINIC | Age: 59
End: 2019-05-15

## 2019-05-15 NOTE — TELEPHONE ENCOUNTER
I informed her of her normal echocardiogram.  She is asymptomatic.  She literally just hiked 9 miles today.  At this point in time I think that we can see her again as needed.

## 2020-01-13 RX ORDER — ESTRADIOL 0.04 MG/D
1 FILM, EXTENDED RELEASE TRANSDERMAL 2 TIMES WEEKLY
Qty: 24 PATCH | Refills: 0 | Status: SHIPPED | OUTPATIENT
Start: 2020-01-13 | End: 2020-04-14

## 2020-04-14 RX ORDER — ESTRADIOL 0.04 MG/D
1 FILM, EXTENDED RELEASE TRANSDERMAL 2 TIMES WEEKLY
Qty: 24 PATCH | Refills: 0 | Status: SHIPPED | OUTPATIENT
Start: 2020-04-16 | End: 2020-06-30

## 2020-05-27 ENCOUNTER — APPOINTMENT (OUTPATIENT)
Dept: WOMENS IMAGING | Facility: HOSPITAL | Age: 60
End: 2020-05-27

## 2020-05-27 PROCEDURE — 77063 BREAST TOMOSYNTHESIS BI: CPT | Performed by: RADIOLOGY

## 2020-05-27 PROCEDURE — 77067 SCR MAMMO BI INCL CAD: CPT | Performed by: RADIOLOGY

## 2020-05-29 ENCOUNTER — APPOINTMENT (OUTPATIENT)
Dept: WOMENS IMAGING | Facility: HOSPITAL | Age: 60
End: 2020-05-29

## 2020-05-29 DIAGNOSIS — R92.8 ABNORMAL MAMMOGRAM: Primary | ICD-10-CM

## 2020-05-29 PROCEDURE — 77061 BREAST TOMOSYNTHESIS UNI: CPT | Performed by: RADIOLOGY

## 2020-05-29 PROCEDURE — 77065 DX MAMMO INCL CAD UNI: CPT | Performed by: RADIOLOGY

## 2020-05-29 PROCEDURE — 76641 ULTRASOUND BREAST COMPLETE: CPT | Performed by: RADIOLOGY

## 2020-05-29 PROCEDURE — G0279 TOMOSYNTHESIS, MAMMO: HCPCS | Performed by: RADIOLOGY

## 2020-06-01 ENCOUNTER — TELEPHONE (OUTPATIENT)
Dept: OBSTETRICS AND GYNECOLOGY | Age: 60
End: 2020-06-01

## 2020-06-01 NOTE — TELEPHONE ENCOUNTER
Pt called wanting to know if you had heard anything from [facility] on her results?    Pt also wants to know who you would refer for future care.      [phone]

## 2020-06-30 RX ORDER — ESTRADIOL 0.04 MG/D
1 FILM, EXTENDED RELEASE TRANSDERMAL 2 TIMES WEEKLY
Qty: 24 PATCH | Refills: 0 | Status: SHIPPED | OUTPATIENT
Start: 2020-07-02 | End: 2020-08-13 | Stop reason: SDUPTHER

## 2020-08-13 RX ORDER — ESTRADIOL 0.04 MG/D
1 FILM, EXTENDED RELEASE TRANSDERMAL 2 TIMES WEEKLY
Qty: 24 PATCH | Refills: 0 | Status: SHIPPED | OUTPATIENT
Start: 2020-08-13 | End: 2020-11-02

## 2020-08-13 NOTE — TELEPHONE ENCOUNTER
Former dr santoro pt has AE scheduled with you on 9/23/20. She is requesting refill on her medications sent to pharmacy on file.

## 2020-09-02 ENCOUNTER — TELEPHONE (OUTPATIENT)
Dept: OBSTETRICS AND GYNECOLOGY | Age: 60
End: 2020-09-02

## 2020-09-02 NOTE — TELEPHONE ENCOUNTER
Patient called, she's a Mary transfer and has an up coming appointment with  in October. Patient is requesting if she could be seen sooner if possible?    Patient stated that she's menopausal and she's been bleeding/ spotting I asked the patient if she was experiencing any other symptoms  pt denies abdominal pain & all other possible symptoms.      Would you like to see this patient sometime soon? Or would you like this patient to see NP/PA? Please advise

## 2020-09-03 ENCOUNTER — PROCEDURE VISIT (OUTPATIENT)
Dept: OBSTETRICS AND GYNECOLOGY | Age: 60
End: 2020-09-03

## 2020-09-03 ENCOUNTER — PREP FOR SURGERY (OUTPATIENT)
Dept: OTHER | Facility: HOSPITAL | Age: 60
End: 2020-09-03

## 2020-09-03 ENCOUNTER — OFFICE VISIT (OUTPATIENT)
Dept: OBSTETRICS AND GYNECOLOGY | Age: 60
End: 2020-09-03

## 2020-09-03 VITALS
HEIGHT: 62 IN | WEIGHT: 114.2 LBS | BODY MASS INDEX: 21.02 KG/M2 | SYSTOLIC BLOOD PRESSURE: 112 MMHG | DIASTOLIC BLOOD PRESSURE: 64 MMHG

## 2020-09-03 DIAGNOSIS — N95.0 PMB (POSTMENOPAUSAL BLEEDING): Primary | ICD-10-CM

## 2020-09-03 DIAGNOSIS — N88.2 STENOTIC CERVICAL OS: ICD-10-CM

## 2020-09-03 PROCEDURE — 76830 TRANSVAGINAL US NON-OB: CPT | Performed by: OBSTETRICS & GYNECOLOGY

## 2020-09-03 PROCEDURE — 99214 OFFICE O/P EST MOD 30 MIN: CPT | Performed by: OBSTETRICS & GYNECOLOGY

## 2020-09-03 RX ORDER — MISOPROSTOL 200 UG/1
TABLET ORAL
Qty: 2 TABLET | Refills: 0 | Status: SHIPPED | OUTPATIENT
Start: 2020-09-03

## 2020-09-03 RX ORDER — SODIUM CHLORIDE 0.9 % (FLUSH) 0.9 %
10 SYRINGE (ML) INJECTION AS NEEDED
Status: CANCELLED | OUTPATIENT
Start: 2020-09-03

## 2020-09-03 RX ORDER — SODIUM CHLORIDE 0.9 % (FLUSH) 0.9 %
3 SYRINGE (ML) INJECTION EVERY 12 HOURS SCHEDULED
Status: CANCELLED | OUTPATIENT
Start: 2020-09-03

## 2020-09-03 NOTE — PROGRESS NOTES
"Subjective     Chief Complaint   Patient presents with   • Gynecologic Exam     New gyn, Dr. Hernandez pt, PMB US today, Last AE 02/15/19, Last pap 18 neg, Mg 2020, DEXA 10/04/16   • [facility]       Yasmin Armenta is a 60 y.o.  whose LMP is Patient's last menstrual period was 2017. presents with occasional episodes of postmenopausal bleeding, usually occurs when she is due to change her patch. Recently had some bright red bleeding. No pain or cramping. Happy w/ estrogen and progesterone for hot flashes and symptoms of menopause. Prior D&C polypectomy  (benign polyp)      No Additional Complaints Reported    The following portions of the patient's history were reviewed and updated as appropriate:vital signs, allergies, current medications, past family history, past medical history, past social history, past surgical history and problem list      Review of Systems   A comprehensive review of systems was negative except for:  postmenopausal bleeding    Objective      /64   Ht 157.5 cm (62\")   Wt 51.8 kg (114 lb 3.2 oz)   LMP 2017   Breastfeeding No   BMI 20.89 kg/m²     Physical Exam    General:   alert, appears stated age and no distress   Heart:    Lungs:    Breast:    Neck:    Abdomen:    CVA: Not performed today   Pelvis: Urinary system: urethral meatus normal  Vaginal: normal mucosa without prolapse or lesions  Cervix: normal appearance and no lesions noted, internal os stenotic   Extremities: Extremities normal, atraumatic, no cyanosis or edema   Neurologic: negative   Psychiatric: Normal affect, judgement, and mood       Lab Review   Labs: pap 2018 normal, HPV-     Imaging   Ultrasound - Pelvic Vaginal (images reviewed)  Uterus RV, 7.7 x 5.8 x 5 cm, EML 6.6 mm, endometrium appears bright and complex (possible endometrial polyps) normal small ovaries, subserosal fibroid 2 x 2 cm (doesn't deviate the cavity)    Assessment/Plan     ASSESSMENT  1. PMB " (postmenopausal bleeding)    2. Stenotic cervical os    complex appearing endometrium, possible polyps    PLAN  1. No orders of the defined types were placed in this encounter.      2. Medications prescribed this encounter:        New Medications Ordered This Visit   Medications   • miSOPROStol (Cytotec) 200 MCG tablet     Si tab po the night before hysteroscopy D&C     Dispense:  2 tablet     Refill:  0       3. Attempted endometrial biopsy after informed consent obtained. Betadine prep of cervix. Internal os stenotic. Unable to obtain EMB even with tenaculum for retraction.   4. Recommend hysteroscopy, Myosure sampling of endometrium and removal of any endometrial polyps. Risks of surgery reviewed-general anesthesia, bleeding, transfusion, infection, uterine perforation with damage to adjacent structures. Pt agrees to proceed as planned. Will pre-treat with cytotec due to stenotic internal os.        Luli Cooper MD  9/3/2020

## 2020-11-02 RX ORDER — ESTRADIOL 0.04 MG/D
1 FILM, EXTENDED RELEASE TRANSDERMAL 2 TIMES WEEKLY
Qty: 24 PATCH | Refills: 0 | Status: SHIPPED | OUTPATIENT
Start: 2020-11-02 | End: 2021-03-05 | Stop reason: HOSPADM

## 2021-03-22 ENCOUNTER — BULK ORDERING (OUTPATIENT)
Dept: CASE MANAGEMENT | Facility: OTHER | Age: 61
End: 2021-03-22

## 2021-03-22 DIAGNOSIS — Z23 IMMUNIZATION DUE: ICD-10-CM

## 2021-04-15 RX ORDER — ESTRADIOL 0.04 MG/D
1 FILM, EXTENDED RELEASE TRANSDERMAL 2 TIMES WEEKLY
Qty: 24 PATCH | Refills: 0 | OUTPATIENT
Start: 2021-04-15

## 2021-10-27 ENCOUNTER — APPOINTMENT (OUTPATIENT)
Dept: WOMENS IMAGING | Facility: HOSPITAL | Age: 61
End: 2021-10-27

## 2021-10-27 PROCEDURE — 77067 SCR MAMMO BI INCL CAD: CPT | Performed by: RADIOLOGY

## 2021-10-27 PROCEDURE — 77063 BREAST TOMOSYNTHESIS BI: CPT | Performed by: RADIOLOGY

## 2023-03-22 ENCOUNTER — APPOINTMENT (OUTPATIENT)
Dept: WOMENS IMAGING | Facility: HOSPITAL | Age: 63
End: 2023-03-22
Payer: COMMERCIAL

## 2023-03-22 PROCEDURE — 77067 SCR MAMMO BI INCL CAD: CPT | Performed by: RADIOLOGY

## 2023-03-22 PROCEDURE — 77063 BREAST TOMOSYNTHESIS BI: CPT | Performed by: RADIOLOGY

## 2024-09-13 ENCOUNTER — APPOINTMENT (OUTPATIENT)
Dept: WOMENS IMAGING | Facility: HOSPITAL | Age: 64
End: 2024-09-13
Payer: COMMERCIAL

## 2024-09-13 PROCEDURE — 77063 BREAST TOMOSYNTHESIS BI: CPT | Performed by: RADIOLOGY

## 2024-09-13 PROCEDURE — 77067 SCR MAMMO BI INCL CAD: CPT | Performed by: RADIOLOGY
